# Patient Record
Sex: FEMALE | Race: ASIAN | NOT HISPANIC OR LATINO | Employment: UNEMPLOYED | URBAN - METROPOLITAN AREA
[De-identification: names, ages, dates, MRNs, and addresses within clinical notes are randomized per-mention and may not be internally consistent; named-entity substitution may affect disease eponyms.]

---

## 2017-02-21 ENCOUNTER — DOCUMENTATION (OUTPATIENT)
Dept: ONCOLOGY | Facility: CLINIC | Age: 47
End: 2017-02-21

## 2017-02-21 ENCOUNTER — CLINICAL SUPPORT (OUTPATIENT)
Dept: ONCOLOGY | Facility: HOSPITAL | Age: 47
End: 2017-02-21

## 2017-02-21 ENCOUNTER — LAB (OUTPATIENT)
Dept: LAB | Facility: HOSPITAL | Age: 47
End: 2017-02-21

## 2017-02-21 DIAGNOSIS — G43.719 INTRACTABLE CHRONIC MIGRAINE WITHOUT AURA AND WITHOUT STATUS MIGRAINOSUS: Primary | ICD-10-CM

## 2017-02-21 LAB
BASOPHILS # BLD AUTO: 0.03 10*3/MM3 (ref 0–0.1)
BASOPHILS NFR BLD AUTO: 0.4 % (ref 0–1.1)
DEPRECATED RDW RBC AUTO: 50.5 FL (ref 37–49)
EOSINOPHIL # BLD AUTO: 0.08 10*3/MM3 (ref 0–0.36)
EOSINOPHIL NFR BLD AUTO: 1 % (ref 1–5)
ERYTHROCYTE [DISTWIDTH] IN BLOOD BY AUTOMATED COUNT: 13.9 % (ref 11.7–14.5)
HCT VFR BLD AUTO: 35 % (ref 34–45)
HGB BLD-MCNC: 11.6 G/DL (ref 11.5–14.9)
IMM GRANULOCYTES # BLD: 0.03 10*3/MM3 (ref 0–0.03)
IMM GRANULOCYTES NFR BLD: 0.4 % (ref 0–0.5)
LYMPHOCYTES # BLD AUTO: 3.09 10*3/MM3 (ref 1–3.5)
LYMPHOCYTES NFR BLD AUTO: 39.3 % (ref 20–49)
MCH RBC QN AUTO: 32.7 PG (ref 27–33)
MCHC RBC AUTO-ENTMCNC: 33.1 G/DL (ref 32–35)
MCV RBC AUTO: 98.6 FL (ref 83–97)
MONOCYTES # BLD AUTO: 0.44 10*3/MM3 (ref 0.25–0.8)
MONOCYTES NFR BLD AUTO: 5.6 % (ref 4–12)
NEUTROPHILS # BLD AUTO: 4.19 10*3/MM3 (ref 1.5–7)
NEUTROPHILS NFR BLD AUTO: 53.3 % (ref 39–75)
NRBC BLD MANUAL-RTO: 0 /100 WBC (ref 0–0)
PLATELET # BLD AUTO: 121 10*3/MM3 (ref 150–375)
PMV BLD AUTO: 9.9 FL (ref 8.9–12.1)
RBC # BLD AUTO: 3.55 10*6/MM3 (ref 3.9–5)
WBC NRBC COR # BLD: 7.86 10*3/MM3 (ref 4–10)

## 2017-02-21 PROCEDURE — 85025 COMPLETE CBC W/AUTO DIFF WBC: CPT | Performed by: INTERNAL MEDICINE

## 2017-02-21 PROCEDURE — 36415 COLL VENOUS BLD VENIPUNCTURE: CPT | Performed by: INTERNAL MEDICINE

## 2017-02-21 NOTE — PROGRESS NOTES
BAIRON assisted pt in completing her Living Will today. She said she just returned from a 2 month trip to [facility]. She obtained some type of herbs which she has been taking, and said she does not need other medication now. She spoke of problems with her ears and throat after her plane landed, and she has seen a MD. Her Living Will was copied and will be placed in her file. BAIRON talked with her about providing a copy of it to her other providers.

## 2017-03-31 ENCOUNTER — OFFICE VISIT (OUTPATIENT)
Dept: ONCOLOGY | Facility: CLINIC | Age: 47
End: 2017-03-31

## 2017-03-31 ENCOUNTER — LAB (OUTPATIENT)
Dept: LAB | Facility: HOSPITAL | Age: 47
End: 2017-03-31

## 2017-03-31 VITALS
HEART RATE: 86 BPM | WEIGHT: 132 LBS | RESPIRATION RATE: 16 BRPM | SYSTOLIC BLOOD PRESSURE: 104 MMHG | TEMPERATURE: 98.4 F | BODY MASS INDEX: 25.91 KG/M2 | DIASTOLIC BLOOD PRESSURE: 70 MMHG | OXYGEN SATURATION: 100 % | HEIGHT: 60 IN

## 2017-03-31 DIAGNOSIS — D61.09 OTHER CONSTITUTIONAL APLASTIC ANEMIA (HCC): ICD-10-CM

## 2017-03-31 DIAGNOSIS — D61.09 OTHER CONSTITUTIONAL APLASTIC ANEMIA (HCC): Primary | ICD-10-CM

## 2017-03-31 LAB
ALBUMIN SERPL-MCNC: 4.1 G/DL (ref 3.5–5.2)
ALBUMIN/GLOB SERPL: 1.4 G/DL (ref 1.1–2.4)
ALP SERPL-CCNC: 43 U/L (ref 38–116)
ALT SERPL W P-5'-P-CCNC: 16 U/L (ref 0–33)
ANION GAP SERPL CALCULATED.3IONS-SCNC: 14.6 MMOL/L
AST SERPL-CCNC: 15 U/L (ref 0–32)
BASOPHILS # BLD AUTO: 0.02 10*3/MM3 (ref 0–0.1)
BASOPHILS NFR BLD AUTO: 0.3 % (ref 0–1.1)
BILIRUB SERPL-MCNC: <0.2 MG/DL (ref 0.1–1.2)
BUN BLD-MCNC: 9 MG/DL (ref 6–20)
BUN/CREAT SERPL: 15.5 (ref 7.3–30)
CALCIUM SPEC-SCNC: 9.6 MG/DL (ref 8.5–10.2)
CHLORIDE SERPL-SCNC: 97 MMOL/L (ref 98–107)
CO2 SERPL-SCNC: 25.4 MMOL/L (ref 22–29)
CREAT BLD-MCNC: 0.58 MG/DL (ref 0.6–1.1)
DEPRECATED RDW RBC AUTO: 50.6 FL (ref 37–49)
EOSINOPHIL # BLD AUTO: 0.07 10*3/MM3 (ref 0–0.36)
EOSINOPHIL NFR BLD AUTO: 1 % (ref 1–5)
ERYTHROCYTE [DISTWIDTH] IN BLOOD BY AUTOMATED COUNT: 13.8 % (ref 11.7–14.5)
FERRITIN SERPL-MCNC: 72.9 NG/ML (ref 11–207)
GFR SERPL CREATININE-BSD FRML MDRD: 112 ML/MIN/1.73
GFR SERPL CREATININE-BSD FRML MDRD: 136 ML/MIN/1.73
GLOBULIN UR ELPH-MCNC: 2.9 GM/DL (ref 1.8–3.5)
GLUCOSE BLD-MCNC: 123 MG/DL (ref 74–124)
HCT VFR BLD AUTO: 34.6 % (ref 34–45)
HGB BLD-MCNC: 11.3 G/DL (ref 11.5–14.9)
IMM GRANULOCYTES # BLD: 0.02 10*3/MM3 (ref 0–0.03)
IMM GRANULOCYTES NFR BLD: 0.3 % (ref 0–0.5)
LYMPHOCYTES # BLD AUTO: 2.3 10*3/MM3 (ref 1–3.5)
LYMPHOCYTES NFR BLD AUTO: 34.4 % (ref 20–49)
MCH RBC QN AUTO: 32.9 PG (ref 27–33)
MCHC RBC AUTO-ENTMCNC: 32.7 G/DL (ref 32–35)
MCV RBC AUTO: 100.9 FL (ref 83–97)
MONOCYTES # BLD AUTO: 0.39 10*3/MM3 (ref 0.25–0.8)
MONOCYTES NFR BLD AUTO: 5.8 % (ref 4–12)
NEUTROPHILS # BLD AUTO: 3.89 10*3/MM3 (ref 1.5–7)
NEUTROPHILS NFR BLD AUTO: 58.2 % (ref 39–75)
NRBC BLD MANUAL-RTO: 0 /100 WBC (ref 0–0)
PLATELET # BLD AUTO: 106 10*3/MM3 (ref 150–375)
PMV BLD AUTO: 9.8 FL (ref 8.9–12.1)
POTASSIUM BLD-SCNC: 3.9 MMOL/L (ref 3.5–4.7)
PROT SERPL-MCNC: 7 G/DL (ref 6.3–8)
RBC # BLD AUTO: 3.43 10*6/MM3 (ref 3.9–5)
SODIUM BLD-SCNC: 137 MMOL/L (ref 134–145)
WBC NRBC COR # BLD: 6.69 10*3/MM3 (ref 4–10)

## 2017-03-31 PROCEDURE — 82728 ASSAY OF FERRITIN: CPT

## 2017-03-31 PROCEDURE — 85025 COMPLETE CBC W/AUTO DIFF WBC: CPT

## 2017-03-31 PROCEDURE — 80053 COMPREHEN METABOLIC PANEL: CPT

## 2017-03-31 PROCEDURE — 36415 COLL VENOUS BLD VENIPUNCTURE: CPT

## 2017-03-31 PROCEDURE — 99213 OFFICE O/P EST LOW 20 MIN: CPT | Performed by: INTERNAL MEDICINE

## 2017-04-11 ENCOUNTER — APPOINTMENT (OUTPATIENT)
Dept: GENERAL RADIOLOGY | Facility: HOSPITAL | Age: 47
End: 2017-04-11

## 2017-04-11 ENCOUNTER — APPOINTMENT (OUTPATIENT)
Dept: ULTRASOUND IMAGING | Facility: HOSPITAL | Age: 47
End: 2017-04-11

## 2017-04-11 ENCOUNTER — HOSPITAL ENCOUNTER (EMERGENCY)
Facility: HOSPITAL | Age: 47
Discharge: HOME | End: 2017-04-12
Attending: EMERGENCY MEDICINE | Admitting: EMERGENCY MEDICINE

## 2017-04-11 DIAGNOSIS — S90.31XA CONTUSION OF RIGHT FOOT, INITIAL ENCOUNTER: ICD-10-CM

## 2017-04-11 DIAGNOSIS — Z34.91 FIRST TRIMESTER PREGNANCY: ICD-10-CM

## 2017-04-11 DIAGNOSIS — S30.1XXA ABDOMINAL WALL CONTUSION: ICD-10-CM

## 2017-04-11 DIAGNOSIS — T14.8XXA CONTUSION OF LEFT CLAVICLE, INITIAL ENCOUNTER: ICD-10-CM
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
## 2017-04-11 DIAGNOSIS — S80.01XA CONTUSION OF RIGHT KNEE, INITIAL ENCOUNTER: ICD-10-CM

## 2017-04-11 DIAGNOSIS — S80.02XA CONTUSION OF LEFT KNEE, INITIAL ENCOUNTER: ICD-10-CM

## 2017-04-11 DIAGNOSIS — S20.219A CHEST WALL CONTUSION, UNSPECIFIED LATERALITY, INITIAL ENCOUNTER: ICD-10-CM

## 2017-04-11 LAB — LIPASE SERPL-CCNC: 34 U/L (ref 13–60)

## 2017-04-11 PROCEDURE — 73620 X-RAY EXAM OF FOOT: CPT

## 2017-04-11 PROCEDURE — 96374 THER/PROPH/DIAG INJ IV PUSH: CPT

## 2017-04-11 PROCEDURE — 99283 EMERGENCY DEPT VISIT LOW MDM: CPT

## 2017-04-11 PROCEDURE — 83690 ASSAY OF LIPASE: CPT | Performed by: EMERGENCY MEDICINE

## 2017-04-11 PROCEDURE — 76817 TRANSVAGINAL US OBSTETRIC: CPT

## 2017-04-11 PROCEDURE — 76700 US EXAM ABDOM COMPLETE: CPT

## 2017-04-11 PROCEDURE — 86901 BLOOD TYPING SEROLOGIC RH(D): CPT | Performed by: EMERGENCY MEDICINE

## 2017-04-11 PROCEDURE — 96361 HYDRATE IV INFUSION ADD-ON: CPT

## 2017-04-11 PROCEDURE — 73000 X-RAY EXAM OF COLLAR BONE: CPT

## 2017-04-11 PROCEDURE — 93976 VASCULAR STUDY: CPT

## 2017-04-11 PROCEDURE — 84702 CHORIONIC GONADOTROPIN TEST: CPT | Performed by: EMERGENCY MEDICINE

## 2017-04-11 PROCEDURE — 86900 BLOOD TYPING SEROLOGIC ABO: CPT | Performed by: EMERGENCY MEDICINE

## 2017-04-11 PROCEDURE — 71020 HC CHEST PA AND LATERAL: CPT

## 2017-04-11 PROCEDURE — 85460 HEMOGLOBIN FETAL: CPT | Performed by: EMERGENCY MEDICINE

## 2017-04-11 PROCEDURE — 73560 X-RAY EXAM OF KNEE 1 OR 2: CPT

## 2017-04-11 PROCEDURE — 80053 COMPREHEN METABOLIC PANEL: CPT | Performed by: EMERGENCY MEDICINE

## 2017-04-11 PROCEDURE — 76801 OB US < 14 WKS SINGLE FETUS: CPT

## 2017-04-11 RX ORDER — SODIUM CHLORIDE 9 MG/ML
250 INJECTION, SOLUTION INTRAVENOUS CONTINUOUS
Status: DISCONTINUED | OUTPATIENT
Start: 2017-04-11 | End: 2017-04-12 | Stop reason: HOSPADM

## 2017-04-12 ENCOUNTER — APPOINTMENT (OUTPATIENT)
Dept: GENERAL RADIOLOGY | Facility: HOSPITAL | Age: 47
End: 2017-04-12

## 2017-04-12 VITALS
SYSTOLIC BLOOD PRESSURE: 104 MMHG | HEART RATE: 87 BPM | DIASTOLIC BLOOD PRESSURE: 75 MMHG | TEMPERATURE: 97.8 F | OXYGEN SATURATION: 100 % | RESPIRATION RATE: 18 BRPM | HEIGHT: 62 IN | BODY MASS INDEX: 24.29 KG/M2 | WEIGHT: 132 LBS

## 2017-04-12 LAB
ABO GROUP BLD: NORMAL
ALBUMIN SERPL-MCNC: 4.2 G/DL (ref 3.5–5.2)
ALBUMIN/GLOB SERPL: 1.2 G/DL
ALP SERPL-CCNC: 44 U/L (ref 39–117)
ALT SERPL W P-5'-P-CCNC: 16 U/L (ref 1–33)
ANION GAP SERPL CALCULATED.3IONS-SCNC: 16.5 MMOL/L
AST SERPL-CCNC: 19 U/L (ref 1–32)
BACTERIA UR QL AUTO: NORMAL /HPF
BASOPHILS # BLD AUTO: 0.01 10*3/MM3 (ref 0–0.2)
BASOPHILS NFR BLD AUTO: 0.1 % (ref 0–1.5)
BILIRUB SERPL-MCNC: <0.2 MG/DL (ref 0.1–1.2)
BILIRUB UR QL STRIP: NEGATIVE
BUN BLD-MCNC: 6 MG/DL (ref 6–20)
BUN/CREAT SERPL: 15 (ref 7–25)
CALCIUM SPEC-SCNC: 10 MG/DL (ref 8.6–10.5)
CHLORIDE SERPL-SCNC: 100 MMOL/L (ref 98–107)
CLARITY UR: CLEAR
CO2 SERPL-SCNC: 20.5 MMOL/L (ref 22–29)
COLOR UR: YELLOW
CREAT BLD-MCNC: 0.4 MG/DL (ref 0.57–1)
DEPRECATED RDW RBC AUTO: 52.9 FL (ref 37–54)
EOSINOPHIL # BLD AUTO: 0.03 10*3/MM3 (ref 0–0.7)
EOSINOPHIL NFR BLD AUTO: 0.2 % (ref 0.3–6.2)
ERYTHROCYTE [DISTWIDTH] IN BLOOD BY AUTOMATED COUNT: 14.3 % (ref 11.7–13)
FETAL RBC/RBC BLD FC-RTO: 0 %
GFR SERPL CREATININE-BSD FRML MDRD: >150 ML/MIN/1.73
GFR SERPL CREATININE-BSD FRML MDRD: >150 ML/MIN/1.73
GLOBULIN UR ELPH-MCNC: 3.6 GM/DL
GLUCOSE BLD-MCNC: 93 MG/DL (ref 65–99)
GLUCOSE UR STRIP-MCNC: NEGATIVE MG/DL
HCG INTACT+B SERPL-ACNC: NORMAL MIU/ML
HCT VFR BLD AUTO: 33.2 % (ref 35.6–45.5)
HGB BLD-MCNC: 11 G/DL (ref 11.9–15.5)
HGB F BLD QL KLEIH BETKE: NORMAL
HGB UR QL STRIP.AUTO: ABNORMAL
HYALINE CASTS UR QL AUTO: NORMAL /LPF
IMM GRANULOCYTES # BLD: 0.03 10*3/MM3 (ref 0–0.03)
IMM GRANULOCYTES NFR BLD: 0.2 % (ref 0–0.5)
KETONES UR QL STRIP: NEGATIVE
LEUKOCYTE ESTERASE UR QL STRIP.AUTO: NEGATIVE
LYMPHOCYTES # BLD AUTO: 2.45 10*3/MM3 (ref 0.9–4.8)
LYMPHOCYTES NFR BLD AUTO: 20.4 % (ref 19.6–45.3)
MCH RBC QN AUTO: 33.7 PG (ref 26.9–32)
MCHC RBC AUTO-ENTMCNC: 33.1 G/DL (ref 32.4–36.3)
MCV RBC AUTO: 101.8 FL (ref 80.5–98.2)
MONOCYTES # BLD AUTO: 0.6 10*3/MM3 (ref 0.2–1.2)
MONOCYTES NFR BLD AUTO: 5 % (ref 5–12)
NEUTROPHILS # BLD AUTO: 8.91 10*3/MM3 (ref 1.9–8.1)
NEUTROPHILS NFR BLD AUTO: 74.1 % (ref 42.7–76)
NITRITE UR QL STRIP: NEGATIVE
PH UR STRIP.AUTO: 7 [PH] (ref 5–8)
PLATELET # BLD AUTO: 97 10*3/MM3 (ref 140–500)
PMV BLD AUTO: 9.9 FL (ref 6–12)
POTASSIUM BLD-SCNC: 3.8 MMOL/L (ref 3.5–5.2)
PROT SERPL-MCNC: 7.8 G/DL (ref 6–8.5)
PROT UR QL STRIP: NEGATIVE
RBC # BLD AUTO: 3.26 10*6/MM3 (ref 3.9–5.2)
RBC # UR: NORMAL /HPF
REF LAB TEST METHOD: NORMAL
RH BLD: POSITIVE
SODIUM BLD-SCNC: 137 MMOL/L (ref 136–145)
SP GR UR STRIP: 1.01 (ref 1–1.03)
SQUAMOUS #/AREA URNS HPF: NORMAL /HPF
UROBILINOGEN UR QL STRIP: ABNORMAL
WBC NRBC COR # BLD: 12.03 10*3/MM3 (ref 4.5–10.7)
WBC UR QL AUTO: NORMAL /HPF

## 2017-04-12 PROCEDURE — 25010000002 ONDANSETRON PER 1 MG: Performed by: EMERGENCY MEDICINE

## 2017-04-12 PROCEDURE — 73560 X-RAY EXAM OF KNEE 1 OR 2: CPT

## 2017-04-12 PROCEDURE — 81001 URINALYSIS AUTO W/SCOPE: CPT | Performed by: EMERGENCY MEDICINE

## 2017-04-12 PROCEDURE — 85025 COMPLETE CBC W/AUTO DIFF WBC: CPT | Performed by: EMERGENCY MEDICINE

## 2017-04-12 RX ORDER — ONDANSETRON 2 MG/ML
4 INJECTION INTRAMUSCULAR; INTRAVENOUS ONCE
Status: COMPLETED | OUTPATIENT
Start: 2017-04-12 | End: 2017-04-12

## 2017-04-12 RX ADMIN — ONDANSETRON 4 MG: 2 INJECTION INTRAMUSCULAR; INTRAVENOUS at 02:18

## 2017-04-12 RX ADMIN — SODIUM CHLORIDE 250 ML/HR: 9 INJECTION, SOLUTION INTRAVENOUS at 02:18
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
## 2017-04-28 ENCOUNTER — LAB (OUTPATIENT)
Dept: LAB | Facility: HOSPITAL | Age: 47
End: 2017-04-28

## 2017-04-28 ENCOUNTER — CLINICAL SUPPORT (OUTPATIENT)
Dept: ONCOLOGY | Facility: HOSPITAL | Age: 47
End: 2017-04-28

## 2017-04-28 DIAGNOSIS — D61.09 OTHER CONSTITUTIONAL APLASTIC ANEMIA (HCC): ICD-10-CM

## 2017-04-28 LAB
BASOPHILS # BLD AUTO: 0.02 10*3/MM3 (ref 0–0.1)
BASOPHILS NFR BLD AUTO: 0.2 % (ref 0–1.1)
DEPRECATED RDW RBC AUTO: 53.2 FL (ref 37–49)
EOSINOPHIL # BLD AUTO: 0.06 10*3/MM3 (ref 0–0.36)
EOSINOPHIL NFR BLD AUTO: 0.7 % (ref 1–5)
ERYTHROCYTE [DISTWIDTH] IN BLOOD BY AUTOMATED COUNT: 14.6 % (ref 11.7–14.5)
HCT VFR BLD AUTO: 31.3 % (ref 34–45)
HGB BLD-MCNC: 10.7 G/DL (ref 11.5–14.9)
IMM GRANULOCYTES # BLD: 0.04 10*3/MM3 (ref 0–0.03)
IMM GRANULOCYTES NFR BLD: 0.4 % (ref 0–0.5)
LYMPHOCYTES # BLD AUTO: 2.84 10*3/MM3 (ref 1–3.5)
LYMPHOCYTES NFR BLD AUTO: 31 % (ref 20–49)
MCH RBC QN AUTO: 34.6 PG (ref 27–33)
MCHC RBC AUTO-ENTMCNC: 34.2 G/DL (ref 32–35)
MCV RBC AUTO: 101.3 FL (ref 83–97)
MONOCYTES # BLD AUTO: 0.49 10*3/MM3 (ref 0.25–0.8)
MONOCYTES NFR BLD AUTO: 5.4 % (ref 4–12)
NEUTROPHILS # BLD AUTO: 5.7 10*3/MM3 (ref 1.5–7)
NEUTROPHILS NFR BLD AUTO: 62.3 % (ref 39–75)
NRBC BLD MANUAL-RTO: 0 /100 WBC (ref 0–0)
PLATELET # BLD AUTO: 107 10*3/MM3 (ref 150–375)
PMV BLD AUTO: 10.3 FL (ref 8.9–12.1)
RBC # BLD AUTO: 3.09 10*6/MM3 (ref 3.9–5)
WBC NRBC COR # BLD: 9.15 10*3/MM3 (ref 4–10)

## 2017-04-28 PROCEDURE — 85025 COMPLETE CBC W/AUTO DIFF WBC: CPT

## 2017-04-28 PROCEDURE — 36416 COLLJ CAPILLARY BLOOD SPEC: CPT

## 2017-04-28 NOTE — PROGRESS NOTES
CBC reviewed with patient. HGB 10.7. Plts 107k. Patient states she is 2 months pregnant. She has some fatigue. Pt says Dr Loza told her to take OTC Iron once a day. However, when she got pregnant her PCP told her she could stop the Iron since she is taking a prenatal. Reviewed with lAanna OVERTON, patient should continue instructions given by Dr Loza. Patient v/u. Reviewed next appt in 1 month.

## 2017-05-26 ENCOUNTER — APPOINTMENT (OUTPATIENT)
Dept: LAB | Facility: HOSPITAL | Age: 47
End: 2017-05-26

## 2017-05-26 ENCOUNTER — APPOINTMENT (OUTPATIENT)
Dept: ONCOLOGY | Facility: HOSPITAL | Age: 47
End: 2017-05-26

## 2017-06-28 ENCOUNTER — LAB (OUTPATIENT)
Dept: LAB | Facility: HOSPITAL | Age: 47
End: 2017-06-28

## 2017-06-28 ENCOUNTER — OFFICE VISIT (OUTPATIENT)
Dept: ONCOLOGY | Facility: CLINIC | Age: 47
End: 2017-06-28

## 2017-06-28 VITALS
HEART RATE: 97 BPM | WEIGHT: 132.8 LBS | TEMPERATURE: 97.9 F | DIASTOLIC BLOOD PRESSURE: 82 MMHG | RESPIRATION RATE: 16 BRPM | BODY MASS INDEX: 26.07 KG/M2 | SYSTOLIC BLOOD PRESSURE: 118 MMHG | OXYGEN SATURATION: 99 % | HEIGHT: 60 IN

## 2017-06-28 DIAGNOSIS — D69.6 THROMBOCYTOPENIA (HCC): Primary | ICD-10-CM

## 2017-06-28 DIAGNOSIS — D61.09 OTHER CONSTITUTIONAL APLASTIC ANEMIA (HCC): ICD-10-CM

## 2017-06-28 DIAGNOSIS — D69.6 THROMBOCYTOPENIA (HCC): ICD-10-CM

## 2017-06-28 LAB
BASOPHILS # BLD AUTO: 0.01 10*3/MM3 (ref 0–0.1)
BASOPHILS NFR BLD AUTO: 0.1 % (ref 0–1.1)
CHROMATIN AB SERPL-ACNC: <10 IU/ML (ref 0–14)
DEPRECATED RDW RBC AUTO: 51.8 FL (ref 37–49)
EOSINOPHIL # BLD AUTO: 0.09 10*3/MM3 (ref 0–0.36)
EOSINOPHIL NFR BLD AUTO: 1.2 % (ref 1–5)
ERYTHROCYTE [DISTWIDTH] IN BLOOD BY AUTOMATED COUNT: 13.4 % (ref 11.7–14.5)
ERYTHROCYTE [SEDIMENTATION RATE] IN BLOOD: 81 MM/HR (ref 0–20)
FERRITIN SERPL-MCNC: 92.3 NG/ML (ref 11–207)
HCT VFR BLD AUTO: 30.7 % (ref 34–45)
HGB BLD-MCNC: 10.6 G/DL (ref 11.5–14.9)
HGB RETIC QN: 40 PG (ref 29.8–36.1)
IMM GRANULOCYTES # BLD: 0.04 10*3/MM3 (ref 0–0.03)
IMM GRANULOCYTES NFR BLD: 0.5 % (ref 0–0.5)
IMM RETICS NFR: 26.6 % (ref 3–15.8)
IRON 24H UR-MRATE: 91 MCG/DL (ref 37–145)
IRON SATN MFR SERPL: 19 % (ref 14–48)
LDH SERPL-CCNC: 121 U/L (ref 99–259)
LYMPHOCYTES # BLD AUTO: 1.87 10*3/MM3 (ref 1–3.5)
LYMPHOCYTES NFR BLD AUTO: 25.7 % (ref 20–49)
MCH RBC QN AUTO: 36.4 PG (ref 27–33)
MCHC RBC AUTO-ENTMCNC: 34.5 G/DL (ref 32–35)
MCV RBC AUTO: 105.5 FL (ref 83–97)
MONOCYTES # BLD AUTO: 0.33 10*3/MM3 (ref 0.25–0.8)
MONOCYTES NFR BLD AUTO: 4.5 % (ref 4–12)
NEUTROPHILS # BLD AUTO: 4.94 10*3/MM3 (ref 1.5–7)
NEUTROPHILS NFR BLD AUTO: 68 % (ref 39–75)
NRBC BLD MANUAL-RTO: 0 /100 WBC (ref 0–0)
PLATELET # BLD AUTO: 70 10*3/MM3 (ref 150–375)
PLATELETS.RETICULATED NFR BLD AUTO: 5.6 % (ref 1.1–6.1)
PMV BLD AUTO: 10.4 FL (ref 8.9–12.1)
RBC # BLD AUTO: 2.91 10*6/MM3 (ref 3.9–5)
RETICS/RBC NFR AUTO: 2.37 % (ref 0.6–2)
TIBC SERPL-MCNC: 480 MCG/DL (ref 249–505)
TRANSFERRIN SERPL-MCNC: 343 MG/DL (ref 200–360)
VIT B12 BLD-MCNC: 443 PG/ML (ref 250–999)
WBC NRBC COR # BLD: 7.28 10*3/MM3 (ref 4–10)

## 2017-06-28 PROCEDURE — 83540 ASSAY OF IRON: CPT | Performed by: INTERNAL MEDICINE

## 2017-06-28 PROCEDURE — 99213 OFFICE O/P EST LOW 20 MIN: CPT | Performed by: INTERNAL MEDICINE

## 2017-06-28 PROCEDURE — 85652 RBC SED RATE AUTOMATED: CPT | Performed by: INTERNAL MEDICINE

## 2017-06-28 PROCEDURE — 82728 ASSAY OF FERRITIN: CPT | Performed by: INTERNAL MEDICINE

## 2017-06-28 PROCEDURE — 83615 LACTATE (LD) (LDH) ENZYME: CPT | Performed by: INTERNAL MEDICINE

## 2017-06-28 PROCEDURE — 82607 VITAMIN B-12: CPT | Performed by: INTERNAL MEDICINE

## 2017-06-28 PROCEDURE — 36415 COLL VENOUS BLD VENIPUNCTURE: CPT | Performed by: INTERNAL MEDICINE

## 2017-06-28 PROCEDURE — 85055 RETICULATED PLATELET ASSAY: CPT | Performed by: INTERNAL MEDICINE

## 2017-06-28 PROCEDURE — 36416 COLLJ CAPILLARY BLOOD SPEC: CPT | Performed by: INTERNAL MEDICINE

## 2017-06-28 PROCEDURE — 86431 RHEUMATOID FACTOR QUANT: CPT | Performed by: INTERNAL MEDICINE

## 2017-06-28 PROCEDURE — 85046 RETICYTE/HGB CONCENTRATE: CPT | Performed by: INTERNAL MEDICINE

## 2017-06-28 PROCEDURE — 84466 ASSAY OF TRANSFERRIN: CPT | Performed by: INTERNAL MEDICINE

## 2017-06-28 PROCEDURE — 85025 COMPLETE CBC W/AUTO DIFF WBC: CPT | Performed by: INTERNAL MEDICINE

## 2017-06-28 RX ORDER — FERROUS SULFATE 325(65) MG
325 TABLET ORAL
COMMUNITY

## 2017-06-28 RX ORDER — PROMETHAZINE HYDROCHLORIDE 25 MG/1
25 TABLET ORAL EVERY 6 HOURS PRN
COMMUNITY

## 2017-06-28 NOTE — PROGRESS NOTES
DOS: 10/14/16  Subjective .     REASONS FOR FOLLOWUP:    1.  History of aplastic anemia, treated with ATG and cyclosporine in 2004.   2. Osteonecrosis of the hip, but recent MRI showed slight improvement.   Hyperplastic myelodysplastic syndrome with monosomy 7. Most recent bone marrow as of 11/06/2012 shows evidence of a normal cellular bone marrow with maturing trilineage hematopoiesis. FISH did not show any translocations or deletions. Currently followe d with conservative surveillance.     HISTORY OF PRESENT ILLNESS:  The patient is a 46 y.o. year old female who is here for follow-up with the above-mentioned history.    History of Present Illness  patient has had history of aplastic anemia in the past   The patient is a 44-year-old female with the above history, currently here for followup. She has had history of aplastic anemia treated with ATG cyclosporine in the past. Subsequently she has been moved to several different locations including [facility], but refused transplant. She went to the [facility] in [place] in 2008 and 2009 and subsequently in [place], [place]. She has had no issues. She also has seen Dr. Rosenberg in the past. Currently, she has no complaints. She is doing very well.pt denies any fever , weight loss, fatigue. Denies any chest pain, sob.    Patient says she is pregnant.  She follows up with Dr. Hinojosa.  Her platelet count had dropped down to 70 now.  And she is mildly more anemic.     PAST MEDICAL HISTORY:   3. Status post laparoscopic cholecystectomy.   4. Status post cyclosporine, ATG and prednisone in 2006 by Dr. Pedro Ford. She was to undergo tr ansplant but she has not kept these appointments and have moved to about four different places.   5.   OB/GYN HISTORY: Menarche age 19. She continues to have regular periods, 5 days in duration, not heavy. G3, P2, with 1 miscarriage. She has a 14-year-old son and 6-year-old daughter.      HEMATOLOGIC/ONCOLOGIC HISTORY:At initial visit, the patient had recently moved to [place] and was diagnosed with pancytopenia in [id number], and was seen Dr. Dallin Herrmann in [place], and she had lived in the [place] since 1998. She originally emigr a bea from [facility] and settled in [place]. The patient was noted by her primary care physician, Dr. Herrmann, to have pancytopenia which was initially discovered in May 2003. At that time white count was 4.3, hemoglobin 10.8 and platelet count 38,000. M CV was 107 at that time. Differential showed a neutrophil count of 1.72, lymphs 2.1, with all else within normal limits. The patient reported that she felt a substantial amount of fatigue and had significant social stressors in her life. She has never h ad a history of bleeding. Her menstrual periods were regular, lasting 3-7 days. She did not tend to bruise easily. Because of the pancytopenia, the patient underwent vitamin B12 and folate testing, and also bone marrow aspiration and biopsy were done, w ith liver functions and TSH ordered at that time. This was ordered by Dr. Pedro Ford on 07/07/03. He felt a bone marrow aspiration and biopsy was indicated to rule out underlying myelodysplastic syndrome. 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 She was treated with cyclosporin and ATG but tolerated it poorly. They were attempting to arrange stem cell transplant at the [facility] [place] [facility], but the patient at that time did not want to pursue it until her moth er came to the [place]. She was dealing with issues with her ex-, and she needed to take care of her children. It appears she moved to [place] at that point.        She was admitted to the hospital in October 2004 with history of low grade fe vers and abdominal pain. She underwent CT of the abdomen which showed no findings. She had an ovarian cyst but ultrasound showed no torsion or rupture.  "Because of her low grade fevers, her Dickens catheter was removed and cultured, but cultures were ne g ative. She had a mild \"red man\" like reaction with her first dose of vancomycin at that point, including erythema, itching and some hives, with low grade temperature of 100.4. Once the rate was decreased the symptoms resolved and the patient tolerated t he antibiotic infusion well. The patient was followed by Dr. Pedro Ford for several years.        In August 2004 she was admitted with chest pains but it appears it was pleuritic chest discomfort. At that time she had a transfusion and spiral CT showed n o evidence of pulmonary embolus at the time. She received initiation of ATG in 2004, with cyclosporin and prednisone. She tolerated it poorly.        Subsequent to this she went to [place], [place]. Her bone marrow biopsy from July 2003 showed hypercel lular marrow, 40% cellular, changes suggestive of early myelodysplastic syndrome. Cytogenetics was 46XX. She was seen by Dr. Reji Vega at the [facility]. She had continued followup with PRN blood transfusions. In May 2004 , repeat bone marrow biopsy had changed quite dramatically over the one year frame, and showed a 5% cellularity as well as cytogenetic abnormality. Cytogenic abnormality of monosomy-7 was detected. Of the 20 metaphases identified, one cytogenetic abnorma l ity was identified in three cells which was the loss of chromosome 7. Upon detection of this recurrent cytogenetic abnormality, the diagnosis shifted more towards hypoplastic myelodysplastic syndrome rather than aplastic anemia. She had a trial of ATG a n d steroids with cyclosporin in 2004, all had suboptimal hematological response, and she continued to need PRN blood transfusions, as well as platelet transfusions at that time. Her platelet count was holding between 15,000-20,000. In addition to treatmen t with immunosuppressants, she had been seen by the " Bone Marrow Transplant Team at the [facility], Dr. Lobo Lind. Hematopoietic stem cell transplantation using core blood was recommended, and in fact a match was available for the nimo babcock. The patient refused. Subsequent visits with Dr. Ford, as well as psychosocial workers at [facility], continued to deal with the social stressors of her life. Since the utilization of immunosuppressant' s in the fall of 2004, she had been quite stable with just conservative management. Her last transfusion was back in February 2006. She had a head CT back in September 2006 for headaches which was unrevealing. She never wanted to undergo transplant until her mother arrived from [facility]. It was felt by Dr. Lobo Lind that she would benefit from transplant but the patient refused.        The patient currently states she is doing well. She does have chronic fatigue, but she denies any weight loss, chest pain or shortness of breath. The bone marrow ha d shown evidence of 5% cellularity prior to the ATG, cyclosporine and prednisone. Presently she has no other complaints. She states she is leaving for [facility] on 04/06/12 and will not return for about five weeks.        JEAN PAUL negative. Rheumatoid factor 9. Serum protein electrophoresis normal. No monoclonal protein. IgM is 110, IgG 1131, IgA 238, MMA 55, RBC folate 569, vitamin B12 of 419 with ferritin 35, 27% iron saturation, TIBC 235, and serum iron 64.        MRI of the left hip 7/5/12 shows there is sclero sis involving the superior, medial and anterior left femoral head. Femoral head shows osteonecrosis within the left femoral head but appears to be improved compared to the previous MRI 6/9/11. There is no marrow edema extending into the left femoral nec k. There is a mild T1 hypointensity in the proximal, femoral diathesis as well as within the pelvis and lumbar spine. This is similar to the previous exam and demonstrates just a hematopoietic marrow.  "       Peripheral blood for flow done on 12 shows granulocytopenia, left shift, but no evidence of any blasts. No lymphoproliferative disorder.        The patient was seen by Dr. Dangelo on 12 and underwent bone marrow aspiration and biopsy which showed normal cellular bone marrow with maturing trilineage h ematopoiesis. There is moderate thrombocytopenia. FISH studies failed to show any translocation or deletion including the previously detected. The patient is to be followed with conservative surveillance and if the patient worsens, allogeneic hemopoiet ic transplant could be an option down the line.        Patient's MRI of the cervical spine shows diffuse homogenous marrow with signal abnormality but no definite metastatic lesions. Even the MRI of the thoracic spine, there is no evidence of any metastatic lesions, she just has diffuse homogenous marrow with signal abnormality likely because of her underlying history of the aplastic anemia.                    MEDICATIONS: The current medication list was reviewed with the patient and updated in the EMR this date per the Medical Assistant. Medication dosages and frequencies were confirmed to be accurate.     ALLERGIES: Unsure, but it appears she had a \"red man\" like reaction with vancomycin infusion which got better after the infusion was slowed.     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 She does not smoke, does not drink alcohol. She has no HIV risk factors. She lives alone. Her children live with their uncle in [place].     FAMILY HISTORY: Father  when she was 5-years-old - unsure of the cause; mother is age 66 and lives in [facility]. She has 1 brother and 1 sister in good health.      Review of Systems  A comprehensive 14 point review of systems was performed and was negative except as mentioned.    Objective      Vitals:    17 1503   BP: 118/82   Pulse: 97   Resp: 16   Temp: 97.9 °F (36.6 °C)   TempSrc: Oral   SpO2: 99% " "  Weight: 132 lb 12.8 oz (60.2 kg)  Comment: 5mo gestation   Height: 60.24\" (153 cm)   PainSc: 0-No pain     Current Status 6/28/2017   ECOG score 0       Physical Exam    GENERAL:  Well-developed, well-nourished in no acute distress.   SKIN:  Warm, dry without rashes, purpura or petechiae.  HEAD:  Normocephalic.  EYES:  Pupils equal, round and reactive to light.  EOMs intact.  Conjunctivae normal.  EARS:  Hearing intact.  NOSE:  Septum midline.  No excoriations or nasal discharge.  MOUTH:  Tongue is well-papillated; no stomatitis or ulcers.  Lips normal.  THROAT:  Oropharynx without lesions or exudates.  NECK:  Supple with good range of motion; no thyromegaly or masses, no JVD.  LYMPHATICS:  No cervical, supraclavicular, axillary or inguinal adenopathy.  CHEST:  Lungs clear to percussion and auscultation. Good airflow.  CARDIAC:  Regular rate and rhythm without murmurs, rubs or gallops. Normal S1,S2.  ABDOMEN:  Soft, nontender with no organomegaly or masses.  EXTREMITIES:  No clubbing, cyanosis or edema.  NEUROLOGICAL:  Cranial Nerves II-XII grossly intact.  No focal neurological deficits.  PSYCHIATRIC:  Normal affect and mood.      RECENT LABS:  Hematology WBC   Date Value Ref Range Status   06/28/2017 7.28 4.00 - 10.00 10*3/mm3 Final     RBC   Date Value Ref Range Status   06/28/2017 2.91 (L) 3.90 - 5.00 10*6/mm3 Final     Hemoglobin   Date Value Ref Range Status   06/28/2017 10.6 (L) 11.5 - 14.9 g/dL Final     Hematocrit   Date Value Ref Range Status   06/28/2017 30.7 (L) 34.0 - 45.0 % Final     Platelets   Date Value Ref Range Status   06/28/2017 70 (L) 150 - 375 10*3/mm3 Final        Assessment/Plan     1. This is a patient with history of aplastic anemia, treated with ATG and cyclosporine in 2004 currently without evidence of progression.   2. History of osteonecrosis of the hip followed with observation.   3. Patient recently pregnant, followed by GYN.  4.  Mild worsening anemia and thrombocytopenia likely " secondary to pregnancy.  We will do a complete workup with iron studies, B12, RBC folate, flow cytometry, JEAN PAUL and rheumatoid factor.  We will see her back in 1 week and discuss with her OB/GYN once she tells me her exact name.      Tabby Loza MD

## 2017-06-29 ENCOUNTER — APPOINTMENT (OUTPATIENT)
Dept: WOMENS IMAGING | Facility: HOSPITAL | Age: 47
End: 2017-06-29

## 2017-06-29 LAB
CENTROMERE B AB SER-ACNC: <0.2 AI (ref 0–0.9)
CHROMATIN AB SERPL-ACNC: <0.2 AI (ref 0–0.9)
DSDNA AB SER-ACNC: 3 IU/ML (ref 0–9)
ENA JO1 AB SER-ACNC: <0.2 AI (ref 0–0.9)
ENA RNP AB SER-ACNC: 0.3 AI (ref 0–0.9)
ENA SCL70 AB SER-ACNC: <0.2 AI (ref 0–0.9)
ENA SM AB SER-ACNC: <0.2 AI (ref 0–0.9)
ENA SS-A AB SER-ACNC: <0.2 AI (ref 0–0.9)
ENA SS-B AB SER-ACNC: <0.2 AI (ref 0–0.9)
FOLATE BLD-MCNC: 410.1 NG/ML
FOLATE RBC-MCNC: 1376 NG/ML
HCT VFR BLD AUTO: 29.8 % (ref 34–46.6)
Lab: NORMAL

## 2017-06-29 PROCEDURE — 76641 ULTRASOUND BREAST COMPLETE: CPT | Performed by: RADIOLOGY

## 2017-07-02 LAB — METHYLMALONATE SERPL-SCNC: 110 NMOL/L (ref 0–378)

## 2017-07-03 LAB — REF LAB TEST METHOD: NORMAL

## 2017-07-07 ENCOUNTER — OFFICE VISIT (OUTPATIENT)
Dept: ONCOLOGY | Facility: CLINIC | Age: 47
End: 2017-07-07

## 2017-07-07 ENCOUNTER — LAB (OUTPATIENT)
Dept: LAB | Facility: HOSPITAL | Age: 47
End: 2017-07-07

## 2017-07-07 VITALS
HEART RATE: 102 BPM | SYSTOLIC BLOOD PRESSURE: 124 MMHG | RESPIRATION RATE: 12 BRPM | TEMPERATURE: 97.8 F | BODY MASS INDEX: 25.91 KG/M2 | HEIGHT: 60 IN | WEIGHT: 132 LBS | OXYGEN SATURATION: 100 % | DIASTOLIC BLOOD PRESSURE: 82 MMHG

## 2017-07-07 DIAGNOSIS — D61.9 ANEMIA DUE TO BONE MARROW FAILURE, UNSPECIFIED BONE MARROW FAILURE TYPE (HCC): Primary | ICD-10-CM

## 2017-07-07 DIAGNOSIS — D61.09 OTHER CONSTITUTIONAL APLASTIC ANEMIA (HCC): ICD-10-CM

## 2017-07-07 DIAGNOSIS — G43.719 INTRACTABLE CHRONIC MIGRAINE WITHOUT AURA AND WITHOUT STATUS MIGRAINOSUS: ICD-10-CM

## 2017-07-07 DIAGNOSIS — D69.6 THROMBOCYTOPENIA (HCC): ICD-10-CM

## 2017-07-07 LAB
BASOPHILS # BLD AUTO: 0.01 10*3/MM3 (ref 0–0.1)
BASOPHILS NFR BLD AUTO: 0.1 % (ref 0–1.1)
DEPRECATED RDW RBC AUTO: 52 FL (ref 37–49)
EOSINOPHIL # BLD AUTO: 0.15 10*3/MM3 (ref 0–0.36)
EOSINOPHIL NFR BLD AUTO: 1.8 % (ref 1–5)
ERYTHROCYTE [DISTWIDTH] IN BLOOD BY AUTOMATED COUNT: 13.4 % (ref 11.7–14.5)
HCT VFR BLD AUTO: 29.8 % (ref 34–45)
HGB BLD-MCNC: 10.3 G/DL (ref 11.5–14.9)
IMM GRANULOCYTES # BLD: 0.05 10*3/MM3 (ref 0–0.03)
IMM GRANULOCYTES NFR BLD: 0.6 % (ref 0–0.5)
LYMPHOCYTES # BLD AUTO: 2.37 10*3/MM3 (ref 1–3.5)
LYMPHOCYTES NFR BLD AUTO: 27.9 % (ref 20–49)
MCH RBC QN AUTO: 36.9 PG (ref 27–33)
MCHC RBC AUTO-ENTMCNC: 34.6 G/DL (ref 32–35)
MCV RBC AUTO: 106.8 FL (ref 83–97)
MONOCYTES # BLD AUTO: 0.52 10*3/MM3 (ref 0.25–0.8)
MONOCYTES NFR BLD AUTO: 6.1 % (ref 4–12)
NEUTROPHILS # BLD AUTO: 5.4 10*3/MM3 (ref 1.5–7)
NEUTROPHILS NFR BLD AUTO: 63.5 % (ref 39–75)
NRBC BLD MANUAL-RTO: 0 /100 WBC (ref 0–0)
PLATELET # BLD AUTO: 70 10*3/MM3 (ref 150–375)
PMV BLD AUTO: 11 FL (ref 8.9–12.1)
RBC # BLD AUTO: 2.79 10*6/MM3 (ref 3.9–5)
WBC NRBC COR # BLD: 8.5 10*3/MM3 (ref 4–10)

## 2017-07-07 PROCEDURE — 99213 OFFICE O/P EST LOW 20 MIN: CPT | Performed by: INTERNAL MEDICINE

## 2017-07-07 PROCEDURE — 36415 COLL VENOUS BLD VENIPUNCTURE: CPT

## 2017-07-07 PROCEDURE — 85025 COMPLETE CBC W/AUTO DIFF WBC: CPT

## 2017-07-07 NOTE — PROGRESS NOTES
DOS: 10/14/16  Subjective .     REASONS FOR FOLLOWUP:    1.  History of aplastic anemia, treated with ATG and cyclosporine in 2004.   2. Osteonecrosis of the hip, but recent MRI showed slight improvement.   Hyperplastic myelodysplastic syndrome with monosomy 7. Most recent bone marrow as of 11/06/2012 shows evidence of a normal cellular bone marrow with maturing trilineage hematopoiesis. FISH did not show any translocations or deletions. Currently followe d with conservative surveillance.     HISTORY OF PRESENT ILLNESS:  The patient is a 46 y.o. year old female who is here for follow-up with the above-mentioned history.    History of Present Illness  patient has had history of aplastic anemia in the past   The patient is a 44-year-old female with the above history, currently here for followup. She has had history of aplastic anemia treated with ATG cyclosporine in the past. Subsequently she has been moved to several different locations including [facility], but refused transplant. She went to the [facility] in [place] in 2008 and 2009 and subsequently in [place], [place]. She has had no issues. She also has seen Dr. Rosenberg in the past. Currently, she has no complaints. She is doing very well.pt denies any fever , weight loss, fatigue. Denies any chest pain, sob.    Patient says she is pregnant.  She follows up with Dr. Hinojosa.  Her platelet count had dropped down to 70 now.  And she is mildly more anemic.She is asymptomatic      PAST MEDICAL HISTORY:   3. Status post laparoscopic cholecystectomy.   4. Status post cyclosporine, ATG and prednisone in 2006 by Dr. Pedro Ford. She was to undergo tr ansplant but she has not kept these appointments and have moved to about four different places.   5.   OB/GYN HISTORY: Menarche age 19. She continues to have regular periods, 5 days in duration, not heavy. G3, P2, with 1 miscarriage. She has a 14-year-old son and 6-year-old  daughter.     HEMATOLOGIC/ONCOLOGIC HISTORY:At initial visit, the patient had recently moved to [place] and was diagnosed with pancytopenia in [id number], and was seen Dr. Dallin Hermrann in [place], and she had lived in the [place] since 1998. She originally emigr a bea from [facility] and settled in [place]. The patient was noted by her primary care physician, Dr. Herrmann, to have pancytopenia which was initially discovered in May 2003. At that time white count was 4.3, hemoglobin 10.8 and platelet count 38,000. M CV was 107 at that time. Differential showed a neutrophil count of 1.72, lymphs 2.1, with all else within normal limits. The patient reported that she felt a substantial amount of fatigue and had significant social stressors in her life. She has never h ad a history of bleeding. Her menstrual periods were regular, lasting 3-7 days. She did not tend to bruise easily. Because of the pancytopenia, the patient underwent vitamin B12 and folate testing, and also bone marrow aspiration and biopsy were done, w ith liver functions and TSH ordered at that time. This was ordered by Dr. Pedro Ford on 07/07/03. He felt a bone marrow aspiration and biopsy was indicated to rule out underlying myelodysplastic syndrome. 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 She was treated with cyclosporin and ATG but tolerated it poorly. They were attempting to arrange stem cell transplant at the [facility], but the patient at that time did not want to pursue it until her moth er came to the [place]. She was dealing with issues with her ex-, and she needed to take care of her children. It appears she moved to [place] at that point.        She was admitted to the hospital in October 2004 with history of low grade fe vers and abdominal pain. She underwent CT of the abdomen which showed no findings. She had an ovarian cyst but ultrasound showed no torsion  "or rupture. Because of her low grade fevers, her Dickens catheter was removed and cultured, but cultures were ne g ative. She had a mild \"red man\" like reaction with her first dose of vancomycin at that point, including erythema, itching and some hives, with low grade temperature of 100.4. Once the rate was decreased the symptoms resolved and the patient tolerated t he antibiotic infusion well. The patient was followed by Dr. Pedro Ford for several years.        In August 2004 she was admitted with chest pains but it appears it was pleuritic chest discomfort. At that time she had a transfusion and spiral CT showed n o evidence of pulmonary embolus at the time. She received initiation of ATG in 2004, with cyclosporin and prednisone. She tolerated it poorly.        Subsequent to this she went to [place], [place]. Her bone marrow biopsy from July 2003 showed hypercel lular marrow, 40% cellular, changes suggestive of early myelodysplastic syndrome. Cytogenetics was 46XX. She was seen by Dr. Reji Vega at the [facility]. She had continued followup with PRN blood transfusions. In May 2004 , repeat bone marrow biopsy had changed quite dramatically over the one year frame, and showed a 5% cellularity as well as cytogenetic abnormality. Cytogenic abnormality of monosomy-7 was detected. Of the 20 metaphases identified, one cytogenetic abnorma l ity was identified in three cells which was the loss of chromosome 7. Upon detection of this recurrent cytogenetic abnormality, the diagnosis shifted more towards hypoplastic myelodysplastic syndrome rather than aplastic anemia. She had a trial of ATG a n d steroids with cyclosporin in 2004, all had suboptimal hematological response, and she continued to need PRN blood transfusions, as well as platelet transfusions at that time. Her platelet count was holding between 15,000-20,000. In addition to treatmen t with immunosuppressants, she had been " seen by the Bone Marrow Transplant Team at the [facility], Dr. Lobo Lind. Hematopoietic stem cell transplantation using core blood was recommended, and in fact a match was available for the nimo babcock. The patient refused. Subsequent visits with Dr. Ford, as well as psychosocial workers at [facility], continued to deal with the social stressors of her life. Since the utilization of immunosuppressant' s in the fall of 2004, she had been quite stable with just conservative management. Her last transfusion was back in February 2006. She had a head CT back in September 2006 for headaches which was unrevealing. She never wanted to undergo transplant until her mother arrived from [facility]. It was felt by Dr. Lobo Lind that she would benefit from transplant but the patient refused.        The patient currently states she is doing well. She does have chronic fatigue, but she denies any weight loss, chest pain or shortness of breath. The bone marrow ha d shown evidence of 5% cellularity prior to the ATG, cyclosporine and prednisone. Presently she has no other complaints. She states she is leaving for [facility] on 04/06/12 and will not return for about five weeks.        JEAN PAUL negative. Rheumatoid factor 9. Serum protein electrophoresis normal. No monoclonal protein. IgM is 110, IgG 1131, IgA 238, MMA 55, RBC folate 569, vitamin B12 of 419 with ferritin 35, 27% iron saturation, TIBC 235, and serum iron 64.        MRI of the left hip 7/5/12 shows there is sclero sis involving the superior, medial and anterior left femoral head. Femoral head shows osteonecrosis within the left femoral head but appears to be improved compared to the previous MRI 6/9/11. There is no marrow edema extending into the left femoral nec k. There is a mild T1 hypointensity in the proximal, femoral diathesis as well as within the pelvis and lumbar spine. This is similar to the previous exam and demonstrates just a hematopoietic  "marrow.        Peripheral blood for flow done on 12 shows granulocytopenia, left shift, but no evidence of any blasts. No lymphoproliferative disorder.        The patient was seen by Dr. Dangelo on 12 and underwent bone marrow aspiration and biopsy which showed normal cellular bone marrow with maturing trilineage h ematopoiesis. There is moderate thrombocytopenia. FISH studies failed to show any translocation or deletion including the previously detected. The patient is to be followed with conservative surveillance and if the patient worsens, allogeneic hemopoiet ic transplant could be an option down the line.        Patient's MRI of the cervical spine shows diffuse homogenous marrow with signal abnormality but no definite metastatic lesions. Even the MRI of the thoracic spine, there is no evidence of any metastatic lesions, she just has diffuse homogenous marrow with signal abnormality likely because of her underlying history of the aplastic anemia.                    MEDICATIONS: The current medication list was reviewed with the patient and updated in the EMR this date per the Medical Assistant. Medication dosages and frequencies were confirmed to be accurate.     ALLERGIES: Unsure, but it appears she had a \"red man\" like reaction with vancomycin infusion which got better after the infusion was slowed.     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 She does not smoke, does not drink alcohol. She has no HIV risk factors. She lives alone. Her children live with their uncle in [place].     FAMILY HISTORY: Father  when she was 5-years-old - unsure of the cause; mother is age 66 and lives in [facility]. She has 1 brother and 1 sister in good health.      Review of Systems  A comprehensive 14 point review of systems was performed and was negative except as mentioned.    Objective      Vitals:    17 0904   BP: 124/82   Pulse: 102   Resp: 12   Temp: 97.8 °F (36.6 °C)   TempSrc: Oral " "  SpO2: 100%   Weight: 132 lb (59.9 kg)   Height: 60.24\" (153 cm)   PainSc: 0-No pain     Current Status 7/7/2017   ECOG score 0       Physical Exam    GENERAL:  Well-developed, well-nourished in no acute distress.   SKIN:  Warm, dry without rashes, purpura or petechiae.  HEAD:  Normocephalic.  EYES:  Pupils equal, round and reactive to light.  EOMs intact.  Conjunctivae normal.  EARS:  Hearing intact.  NOSE:  Septum midline.  No excoriations or nasal discharge.  MOUTH:  Tongue is well-papillated; no stomatitis or ulcers.  Lips normal.  THROAT:  Oropharynx without lesions or exudates.  NECK:  Supple with good range of motion; no thyromegaly or masses, no JVD.  LYMPHATICS:  No cervical, supraclavicular, axillary or inguinal adenopathy.  CHEST:  Lungs clear to percussion and auscultation. Good airflow.  CARDIAC:  Regular rate and rhythm without murmurs, rubs or gallops. Normal S1,S2.  ABDOMEN:  Soft, nontender with no organomegaly or masses.  EXTREMITIES:  No clubbing, cyanosis or edema.  NEUROLOGICAL:  Cranial Nerves II-XII grossly intact.  No focal neurological deficits.  PSYCHIATRIC:  Normal affect and mood.      RECENT LABS:  Hematology WBC   Date Value Ref Range Status   07/07/2017 8.50 4.00 - 10.00 10*3/mm3 Final     RBC   Date Value Ref Range Status   07/07/2017 2.79 (L) 3.90 - 5.00 10*6/mm3 Final     Hemoglobin   Date Value Ref Range Status   07/07/2017 10.3 (L) 11.5 - 14.9 g/dL Final     Hematocrit   Date Value Ref Range Status   07/07/2017 29.8 (L) 34.0 - 45.0 % Final     Platelets   Date Value Ref Range Status   07/07/2017 70 (L) 150 - 375 10*3/mm3 Final        Assessment/Plan     1. This is a patient with history of aplastic anemia, treated with ATG and cyclosporine in 2004 currently without evidence of progression.   2. History of osteonecrosis of the hip followed with observation.   3. Patient recently pregnant, followed by GYN.Dr Fox.  4.  Mild worsening anemia and thrombocytopenia likely secondary to " pregnancy.  We did  a complete workup with iron studies, B12, RBC folate, flow cytometry, JEAN PAUL and rheumatoid factor.  All of the testing is negative.  5. Pt is planning to go to [place] and will be back aug 21. Cbc in 1 week and 2 weeks wirh nurse review at 1 week and np at 2 weeks. Will fu with me on aug 21.have told her if she has any bleeding she needs to call us and she could go to ER in [place] if she has probems.    Tabby Loza MD

## 2017-07-14 ENCOUNTER — LAB (OUTPATIENT)
Dept: LAB | Facility: HOSPITAL | Age: 47
End: 2017-07-14

## 2017-07-14 ENCOUNTER — CLINICAL SUPPORT (OUTPATIENT)
Dept: ONCOLOGY | Facility: HOSPITAL | Age: 47
End: 2017-07-14

## 2017-07-14 DIAGNOSIS — D61.9 ANEMIA DUE TO BONE MARROW FAILURE, UNSPECIFIED BONE MARROW FAILURE TYPE (HCC): ICD-10-CM

## 2017-07-14 LAB
BASOPHILS # BLD AUTO: 0.02 10*3/MM3 (ref 0–0.1)
BASOPHILS NFR BLD AUTO: 0.2 % (ref 0–1.1)
DEPRECATED RDW RBC AUTO: 52 FL (ref 37–49)
EOSINOPHIL # BLD AUTO: 0.11 10*3/MM3 (ref 0–0.36)
EOSINOPHIL NFR BLD AUTO: 1.3 % (ref 1–5)
ERYTHROCYTE [DISTWIDTH] IN BLOOD BY AUTOMATED COUNT: 13.3 % (ref 11.7–14.5)
HCT VFR BLD AUTO: 28.3 % (ref 34–45)
HGB BLD-MCNC: 9.8 G/DL (ref 11.5–14.9)
IMM GRANULOCYTES # BLD: 0.05 10*3/MM3 (ref 0–0.03)
IMM GRANULOCYTES NFR BLD: 0.6 % (ref 0–0.5)
LYMPHOCYTES # BLD AUTO: 2.13 10*3/MM3 (ref 1–3.5)
LYMPHOCYTES NFR BLD AUTO: 25.8 % (ref 20–49)
MCH RBC QN AUTO: 37 PG (ref 27–33)
MCHC RBC AUTO-ENTMCNC: 34.6 G/DL (ref 32–35)
MCV RBC AUTO: 106.8 FL (ref 83–97)
MONOCYTES # BLD AUTO: 0.5 10*3/MM3 (ref 0.25–0.8)
MONOCYTES NFR BLD AUTO: 6 % (ref 4–12)
NEUTROPHILS # BLD AUTO: 5.46 10*3/MM3 (ref 1.5–7)
NEUTROPHILS NFR BLD AUTO: 66.1 % (ref 39–75)
NRBC BLD MANUAL-RTO: 0 /100 WBC (ref 0–0)
PLATELET # BLD AUTO: 66 10*3/MM3 (ref 150–375)
PMV BLD AUTO: 10.6 FL (ref 8.9–12.1)
RBC # BLD AUTO: 2.65 10*6/MM3 (ref 3.9–5)
WBC NRBC COR # BLD: 8.27 10*3/MM3 (ref 4–10)

## 2017-07-14 PROCEDURE — 85025 COMPLETE CBC W/AUTO DIFF WBC: CPT

## 2017-07-14 PROCEDURE — 36416 COLLJ CAPILLARY BLOOD SPEC: CPT

## 2017-07-14 NOTE — PROGRESS NOTES
Pt here today for CBC and RN review.  CBC reviewed with pt and copy given to pt.  Hgb 9.8, Plts 66 - both have decreased from last visit.  Pt states that she does have some bleeding of her gums when she uses a toothbrush but not all the time.  Denies blood in stool or changes to stool.  No blood in urine. Pt is currently 5 months pregnant and sees OB/GYN every 3 weeks.   Reviewed labs with Leona OVERTON (she will be seeing pt next week and Dr Loza out of office today).  Orders received to keep appt as scheduled for next week and CBC will drawn.  Make sure pt is using a soft bristle tooth brush and if any changes to call office.    Reviewed above information with pt and s/sx to look for regarding bleeding and pt v/u.    Pt v/u to call office with any questions or concerns.

## 2017-07-21 ENCOUNTER — OFFICE VISIT (OUTPATIENT)
Dept: ONCOLOGY | Facility: CLINIC | Age: 47
End: 2017-07-21

## 2017-07-21 ENCOUNTER — LAB (OUTPATIENT)
Dept: LAB | Facility: HOSPITAL | Age: 47
End: 2017-07-21

## 2017-07-21 VITALS
WEIGHT: 135 LBS | TEMPERATURE: 98.5 F | SYSTOLIC BLOOD PRESSURE: 112 MMHG | BODY MASS INDEX: 26.5 KG/M2 | DIASTOLIC BLOOD PRESSURE: 68 MMHG | HEART RATE: 115 BPM | RESPIRATION RATE: 16 BRPM | HEIGHT: 60 IN | OXYGEN SATURATION: 99 %

## 2017-07-21 DIAGNOSIS — D61.09 OTHER CONSTITUTIONAL APLASTIC ANEMIA (HCC): Primary | ICD-10-CM

## 2017-07-21 DIAGNOSIS — D61.9 ANEMIA DUE TO BONE MARROW FAILURE, UNSPECIFIED BONE MARROW FAILURE TYPE (HCC): ICD-10-CM

## 2017-07-21 LAB
BASOPHILS # BLD AUTO: 0.01 10*3/MM3 (ref 0–0.1)
BASOPHILS NFR BLD AUTO: 0.1 % (ref 0–1.1)
DEPRECATED RDW RBC AUTO: 53.4 FL (ref 37–49)
EOSINOPHIL # BLD AUTO: 0.07 10*3/MM3 (ref 0–0.36)
EOSINOPHIL NFR BLD AUTO: 0.8 % (ref 1–5)
ERYTHROCYTE [DISTWIDTH] IN BLOOD BY AUTOMATED COUNT: 13.7 % (ref 11.7–14.5)
HCT VFR BLD AUTO: 28.1 % (ref 34–45)
HGB BLD-MCNC: 9.7 G/DL (ref 11.5–14.9)
IMM GRANULOCYTES # BLD: 0.08 10*3/MM3 (ref 0–0.03)
IMM GRANULOCYTES NFR BLD: 1 % (ref 0–0.5)
LYMPHOCYTES # BLD AUTO: 1.99 10*3/MM3 (ref 1–3.5)
LYMPHOCYTES NFR BLD AUTO: 23.8 % (ref 20–49)
MCH RBC QN AUTO: 36.9 PG (ref 27–33)
MCHC RBC AUTO-ENTMCNC: 34.5 G/DL (ref 32–35)
MCV RBC AUTO: 106.8 FL (ref 83–97)
MONOCYTES # BLD AUTO: 0.42 10*3/MM3 (ref 0.25–0.8)
MONOCYTES NFR BLD AUTO: 5 % (ref 4–12)
NEUTROPHILS # BLD AUTO: 5.78 10*3/MM3 (ref 1.5–7)
NEUTROPHILS NFR BLD AUTO: 69.3 % (ref 39–75)
NRBC BLD MANUAL-RTO: 0 /100 WBC (ref 0–0)
PLATELET # BLD AUTO: 63 10*3/MM3 (ref 150–375)
PLATELETS.RETICULATED NFR BLD AUTO: 6.5 % (ref 1.1–6.1)
PMV BLD AUTO: 10.7 FL (ref 8.9–12.1)
RBC # BLD AUTO: 2.63 10*6/MM3 (ref 3.9–5)
WBC NRBC COR # BLD: 8.35 10*3/MM3 (ref 4–10)

## 2017-07-21 PROCEDURE — 85025 COMPLETE CBC W/AUTO DIFF WBC: CPT | Performed by: INTERNAL MEDICINE

## 2017-07-21 PROCEDURE — 85055 RETICULATED PLATELET ASSAY: CPT | Performed by: NURSE PRACTITIONER

## 2017-07-21 PROCEDURE — 99213 OFFICE O/P EST LOW 20 MIN: CPT | Performed by: NURSE PRACTITIONER

## 2017-07-21 PROCEDURE — 36416 COLLJ CAPILLARY BLOOD SPEC: CPT | Performed by: INTERNAL MEDICINE

## 2017-07-21 NOTE — PROGRESS NOTES
DOS: 10/14/16  Subjective .     REASONS FOR FOLLOWUP:    1.  History of aplastic anemia, treated with ATG and cyclosporine in 2004.   2. Osteonecrosis of the hip, but recent MRI showed slight improvement.   Hyperplastic myelodysplastic syndrome with monosomy 7. Most recent bone marrow as of 11/06/2012 shows evidence of a normal cellular bone marrow with maturing trilineage hematopoiesis. FISH did not show any translocations or deletions. Currently followe d with conservative surveillance.     HISTORY OF PRESENT ILLNESS:  The patient is a 46 y.o. year old female who is here for follow-up with the above-mentioned history.    History of Present Illness  patient is a 46-year-old female with a history of aplastic anemia who is currently 24 weeks pregnant.  She is here today for follow-up.  She is complaining of some fatigue and some shortness of breath with exertion.  She denies any signs of bleeding.  Last week she reported some bleeding while brushing her teeth, but this has resolved.  She denies fevers or chills.  She is following closely with her OB/GYN Dr. Carissa Palacios every 3 weeks.    PAST MEDICAL HISTORY:   3. Status post laparoscopic cholecystectomy.   4. Status post cyclosporine, ATG and prednisone in 2006 by Dr. Pedro Ford. She was to undergo tr ansplant but she has not kept these appointments and have moved to about four different places.   5.   OB/GYN HISTORY: Menarche age 19. She continues to have regular periods, 5 days in duration, not heavy. G3, P2, with 1 miscarriage. She has a 14-year-old son and 6-year-old daughter.     HEMATOLOGIC/ONCOLOGIC HISTORY:At initial visit, the patient had recently moved to [place] and was diagnosed with pancytopenia in [id number], and was seen Dr. Dallin Herrmann in [place], and she had lived in the [place] since 1998. She originally em[place] a be[place] from [facility] and settled in [place]. The patient was noted by her primary care physician, Dr. Herrmann, to  "have pancytopenia which was initially discovered in May 2003. At that time white count was 4.3, hemoglobin 10.8 and platelet count 38,000. M CV was 107 at that time. Differential showed a neutrophil count of 1.72, lymphs 2.1, with all else within normal limits. The patient reported that she felt a substantial amount of fatigue and had significant social stressors in her life. She has never h ad a history of bleeding. Her menstrual periods were regular, lasting 3-7 days. She did not tend to bruise easily. Because of the pancytopenia, the patient underwent vitamin B12 and folate testing, and also bone marrow aspiration and biopsy were done, w ith liver functions and TSH ordered at that time. This was ordered by Dr. Pedro Ford on 07/07/03. He felt a bone marrow aspiration and biopsy was indicated to rule out underlying myelodysplastic syndrome. 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 She was treated with cyclosporin and ATG but tolerated it poorly. They were attempting to arrange stem cell transplant at the [facility], but the patient at that time did not want to pursue it until her moth er came to the [place]. She was dealing with issues with her ex-, and she needed to take care of her children. It appears she moved to [place] at that point.        She was admitted to the hospital in October 2004 with history of low grade fe vers and abdominal pain. She underwent CT of the abdomen which showed no findings. She had an ovarian cyst but ultrasound showed no torsion or rupture. Because of her low grade fevers, her Dickens catheter was removed and cultured, but cultures were ne g ative. She had a mild \"red man\" like reaction with her first dose of vancomycin at that point, including erythema, itching and some hives, with low grade temperature of 100.4. Once the rate was decreased the symptoms resolved and the patient tolerated t he antibiotic infusion " well. The patient was followed by Dr. Pedro Ford for several years.        In August 2004 she was admitted with chest pains but it appears it was pleuritic chest discomfort. At that time she had a transfusion and spiral CT showed n o evidence of pulmonary embolus at the time. She received initiation of ATG in 2004, with cyclosporin and prednisone. She tolerated it poorly.        Subsequent to this she went to [place], [place]. Her bone marrow biopsy from July 2003 showed hypercel lular marrow, 40% cellular, changes suggestive of early myelodysplastic syndrome. Cytogenetics was 46XX. She was seen by Dr. Reji Vega at the [facility]. She had continued followup with PRN blood transfusions. In May 2004 , repeat bone marrow biopsy had changed quite dramatically over the one year frame, and showed a 5% cellularity as well as cytogenetic abnormality. Cytogenic abnormality of monosomy-7 was detected. Of the 20 metaphases identified, one cytogenetic abnorma l ity was identified in three cells which was the loss of chromosome 7. Upon detection of this recurrent cytogenetic abnormality, the diagnosis shifted more towards hypoplastic myelodysplastic syndrome rather than aplastic anemia. She had a trial of ATG a n d steroids with cyclosporin in 2004, all had suboptimal hematological response, and she continued to need PRN blood transfusions, as well as platelet transfusions at that time. Her platelet count was holding between 15,000-20,000. In addition to treatmen t with immunosuppressants, she had been seen by the Bone Marrow Transplant Team at the [facility], Dr. Lobo Lind. Hematopoietic stem cell transplantation using core blood was recommended, and in fact a match was available for the patien t. The patient refused. Subsequent visits with Dr. Ford, as well as psychosocial workers at [facility], continued to deal with the social stressors of her life. Since the  utilization of immunosuppressant' s in the fall of 2004, she had been quite stable with just conservative management. Her last transfusion was back in February 2006. She had a head CT back in September 2006 for headaches which was unrevealing. She never wanted to undergo transplant until her mother arrived from [facility]. It was felt by Dr. Lobo Lind that she would benefit from transplant but the patient refused.        The patient currently states she is doing well. She does have chronic fatigue, but she denies any weight loss, chest pain or shortness of breath. The bone marrow ha d shown evidence of 5% cellularity prior to the ATG, cyclosporine and prednisone. Presently she has no other complaints. She states she is leaving for [facility] on 04/06/12 and will not return for about five weeks.        JEAN PAUL negative. Rheumatoid factor 9. Serum protein electrophoresis normal. No monoclonal protein. IgM is 110, IgG 1131, IgA 238, MMA 55, RBC folate 569, vitamin B12 of 419 with ferritin 35, 27% iron saturation, TIBC 235, and serum iron 64.        MRI of the left hip 7/5/12 shows there is sclero sis involving the superior, medial and anterior left femoral head. Femoral head shows osteonecrosis within the left femoral head but appears to be improved compared to the previous MRI 6/9/11. There is no marrow edema extending into the left femoral nec k. There is a mild T1 hypointensity in the proximal, femoral diathesis as well as within the pelvis and lumbar spine. This is similar to the previous exam and demonstrates just a hematopoietic marrow.        Peripheral blood for flow done on 7-17-12 shows granulocytopenia, left shift, but no evidence of any blasts. No lymphoproliferative disorder.        The patient was seen by Dr. Dangelo on 11/6/12 and underwent bone marrow aspiration and biopsy which showed normal cellular bone marrow with maturing trilineage h ematopoiesis. There is moderate thrombocytopenia. FISH studies  "failed to show any translocation or deletion including the previously detected. The patient is to be followed with conservative surveillance and if the patient worsens, allogeneic hemopoiet ic transplant could be an option down the line.        Patient's MRI of the cervical spine shows diffuse homogenous marrow with signal abnormality but no definite metastatic lesions. Even the MRI of the thoracic spine, there is no evidence of any metastatic lesions, she just has diffuse homogenous marrow with signal abnormality likely because of her underlying history of the aplastic anemia.                    MEDICATIONS: The current medication list was reviewed with the patient and updated in the EMR this date per the Medical Assistant. Medication dosages and frequencies were confirmed to be accurate.     ALLERGIES: Unsure, but it appears she had a \"red man\" like reaction with vancomycin infusion which got better after the infusion was slowed.     SOCIAL HISTORY: The patient is a Yazidi, she is . She does not smoke, does not drink alcohol. She has no HIV risk factors. She lives alone. Her children live with their uncle in [place].     FAMILY HISTORY: Father  when she was 5-years-old - unsure of the cause; mother is age 66 and lives in [facility]. She has 1 brother and 1 sister in good health.      Review of Systems   Constitutional: Positive for fatigue. Negative for appetite change, chills and fever.   HENT: Negative.  Negative for mouth sores, nosebleeds and trouble swallowing.    Respiratory: Positive for shortness of breath. Negative for cough.    Cardiovascular: Negative.  Negative for chest pain and leg swelling.   Gastrointestinal: Negative.  Negative for abdominal pain, constipation, diarrhea, nausea and vomiting.   Genitourinary: Negative for hematuria.   Musculoskeletal: Negative.    Skin: Negative.  Negative for rash.   Neurological: Negative.  Negative for dizziness, weakness and numbness. " "  Hematological: Negative.  Negative for adenopathy. Does not bruise/bleed easily.     Objective      Vitals:    07/21/17 1339   BP: 112/68   Pulse: 115   Resp: 16   Temp: 98.5 °F (36.9 °C)   TempSrc: Oral   SpO2: 99%   Weight: 135 lb (61.2 kg)   Height: 60.24\" (153 cm)   PainSc: 0-No pain     Current Status 7/21/2017   ECOG score 0       Physical Exam   Constitutional: She is oriented to person, place, and time. She appears well-developed and well-nourished.   HENT:   Head: Normocephalic and atraumatic.   Nose: Nose normal.   Mouth/Throat: Oropharynx is clear and moist and mucous membranes are normal. No oropharyngeal exudate.   Eyes: Pupils are equal, round, and reactive to light.   Neck: Normal range of motion. Neck supple.   Cardiovascular: Normal rate, regular rhythm and normal heart sounds.    Pulmonary/Chest: Effort normal and breath sounds normal. No respiratory distress. She has no wheezes. She has no rhonchi. She has no rales.   Abdominal: Soft. Normal appearance and bowel sounds are normal. She exhibits no distension. There is no hepatosplenomegaly. There is no tenderness.   Musculoskeletal: Normal range of motion. She exhibits no edema.   Neurological: She is alert and oriented to person, place, and time.   Skin: Skin is warm and dry.   Nursing note and vitals reviewed.        RECENT LABS:  Hematology WBC   Date Value Ref Range Status   07/21/2017 8.35 4.00 - 10.00 10*3/mm3 Final     RBC   Date Value Ref Range Status   07/21/2017 2.63 (L) 3.90 - 5.00 10*6/mm3 Final     Hemoglobin   Date Value Ref Range Status   07/21/2017 9.7 (L) 11.5 - 14.9 g/dL Final     Hematocrit   Date Value Ref Range Status   07/21/2017 28.1 (L) 34.0 - 45.0 % Final     Platelets   Date Value Ref Range Status   07/21/2017 63 (L) 150 - 375 10*3/mm3 Final        Assessment/Plan     1. This is a patient with history of aplastic anemia, treated with ATG and cyclosporine in 2004 currently without evidence of progression.   2. History of " osteonecrosis of the hip followed with observation.   3. Patient currently pregnant, followed by OB/ GYN.Dr Fox.  4.  Mild worsening anemia and thrombocytopenia likely secondary to pregnancy.  We did  a complete workup with iron studies, B12, RBC folate, flow cytometry, JEAN PAUL and rheumatoid factor.  All of the testing is negative.  He continues to have some mild worsening of her anemia and thrombocytopenia.  She denies any bleeding at this time.  We will continue to closely monitor.  5. Pt is planning to go to [place] next week, and will be back Aug 21.  She knows to go to the emergency room while out of town if she has issues.  She'll return for follow-up on 8/22/2017 for CBC and nurse practitioner visit.  She is scheduled for return follow-up on 9/20/2017 with Dr. Loza.  TIAN Che

## 2017-08-22 ENCOUNTER — APPOINTMENT (OUTPATIENT)
Dept: LAB | Facility: HOSPITAL | Age: 47
End: 2017-08-22

## 2017-08-22 ENCOUNTER — APPOINTMENT (OUTPATIENT)
Dept: ONCOLOGY | Facility: CLINIC | Age: 47
End: 2017-08-22

## 2017-09-20 ENCOUNTER — APPOINTMENT (OUTPATIENT)
Dept: LAB | Facility: HOSPITAL | Age: 47
End: 2017-09-20

## 2017-09-20 ENCOUNTER — APPOINTMENT (OUTPATIENT)
Dept: ONCOLOGY | Facility: CLINIC | Age: 47
End: 2017-09-20

## 2017-10-03 ENCOUNTER — TELEPHONE (OUTPATIENT)
Dept: ONCOLOGY | Facility: CLINIC | Age: 47
End: 2017-10-03

## 2017-10-03 NOTE — TELEPHONE ENCOUNTER
----- Message from Tabby Loza MD sent at 10/1/2017  1:12 PM EDT -----  Regarding: RE: UPDATE  She likely needs a primary care physician and she can be given the name of either Dr. Kumar ordered Dr. Bains .  She can be referred to any primary care physician.  I just don't know whether she means primary care physician or other hematologist.  Tell her it is really important to keep up her appointment with hematology oncology which is us as she has thrombocytopenia during pregnancy and history of aplastic anemia in the past.    Tabby Loza MD  ----- Message -----     From: Rabia Franco     Sent: 9/25/2017   7:55 AM       To: Tabby Loza MD  Subject: UPDATE                                           Dr. NIETO,         This patient called and canceled her appointment with you in two weeks.  She is currently in [place], and does not know when she will be back in [place].  She also requested to be put in the care of another md here.      Rabia

## 2017-10-06 ENCOUNTER — APPOINTMENT (OUTPATIENT)
Dept: LAB | Facility: HOSPITAL | Age: 47
End: 2017-10-06

## 2017-10-22 ENCOUNTER — TELEPHONE (OUTPATIENT)
Dept: ONCOLOGY | Facility: CLINIC | Age: 47
End: 2017-10-22

## 2017-10-22 NOTE — TELEPHONE ENCOUNTER
I have left a message on the patient's phone to make sure that she calls us back and makes an appointment.  I told her it is important as she is pregnant and his platelets are low that she needs to be followed on a regular basis.  The last time we had called her she had said that she had gone to [place] and then she has not made any further appointments.  I told her it was important to follow-up with the hematologist if she is in [place] or come to us as soon as possible.  Tabby Loza MD

## 2017-10-23 NOTE — TELEPHONE ENCOUNTER
Patient called from [place] today and reported that she had a miscarriage and a blood clot and that she will not be coming here to follow up with us.  I told her it was important for her to continue to follow up with a Hematologist in [place].    Tabby Loza MD

## 2018-05-23 ENCOUNTER — APPOINTMENT (OUTPATIENT)
Dept: WOMENS IMAGING | Facility: HOSPITAL | Age: 48
End: 2018-05-23

## 2018-05-23 PROCEDURE — 77063 BREAST TOMOSYNTHESIS BI: CPT | Performed by: RADIOLOGY

## 2018-05-23 PROCEDURE — 77067 SCR MAMMO BI INCL CAD: CPT | Performed by: RADIOLOGY

## 2021-04-06 ENCOUNTER — BULK ORDERING (OUTPATIENT)
Dept: CASE MANAGEMENT | Facility: OTHER | Age: 51
End: 2021-04-06

## 2021-04-06 DIAGNOSIS — Z23 IMMUNIZATION DUE: ICD-10-CM

## 2021-05-26 ENCOUNTER — RECORDS - HEALTHEAST (OUTPATIENT)
Dept: ADMINISTRATIVE | Facility: CLINIC | Age: 51
End: 2021-05-26